# Patient Record
Sex: FEMALE | Race: OTHER | HISPANIC OR LATINO
[De-identification: names, ages, dates, MRNs, and addresses within clinical notes are randomized per-mention and may not be internally consistent; named-entity substitution may affect disease eponyms.]

---

## 2019-07-25 ENCOUNTER — RESULT REVIEW (OUTPATIENT)
Age: 34
End: 2019-07-25

## 2019-10-01 ENCOUNTER — APPOINTMENT (OUTPATIENT)
Dept: INTERNAL MEDICINE | Facility: CLINIC | Age: 34
End: 2019-10-01

## 2020-09-16 ENCOUNTER — NON-APPOINTMENT (OUTPATIENT)
Age: 35
End: 2020-09-16

## 2020-09-16 ENCOUNTER — APPOINTMENT (OUTPATIENT)
Dept: INTERNAL MEDICINE | Facility: CLINIC | Age: 35
End: 2020-09-16
Payer: COMMERCIAL

## 2020-09-16 VITALS
SYSTOLIC BLOOD PRESSURE: 119 MMHG | BODY MASS INDEX: 21.66 KG/M2 | DIASTOLIC BLOOD PRESSURE: 68 MMHG | HEART RATE: 76 BPM | WEIGHT: 130 LBS | OXYGEN SATURATION: 98 % | HEIGHT: 65 IN

## 2020-09-16 DIAGNOSIS — Z78.9 OTHER SPECIFIED HEALTH STATUS: ICD-10-CM

## 2020-09-16 DIAGNOSIS — Z83.3 FAMILY HISTORY OF DIABETES MELLITUS: ICD-10-CM

## 2020-09-16 DIAGNOSIS — Z00.00 ENCOUNTER FOR GENERAL ADULT MEDICAL EXAMINATION W/OUT ABNORMAL FINDINGS: ICD-10-CM

## 2020-09-16 DIAGNOSIS — Z80.3 FAMILY HISTORY OF MALIGNANT NEOPLASM OF BREAST: ICD-10-CM

## 2020-09-16 PROCEDURE — 99385 PREV VISIT NEW AGE 18-39: CPT | Mod: 25

## 2020-09-16 PROCEDURE — 93000 ELECTROCARDIOGRAM COMPLETE: CPT

## 2020-09-16 PROCEDURE — 36415 COLL VENOUS BLD VENIPUNCTURE: CPT

## 2020-09-16 NOTE — HEALTH RISK ASSESSMENT
[Good] : ~his/her~  mood as  good [Yes] : Yes [] : No [0] : 2) Feeling down, depressed, or hopeless: Not at all (0) [de-identified] : a lotof exercise [Patient reported PAP Smear was normal] : Patient reported PAP Smear was normal [Fully functional (bathing, dressing, toileting, transferring, walking, feeding)] : Fully functional (bathing, dressing, toileting, transferring, walking, feeding) [Reports changes in hearing] : Reports no changes in hearing [Fully functional (using the telephone, shopping, preparing meals, housekeeping, doing laundry, using] : Fully functional and needs no help or supervision to perform IADLs (using the telephone, shopping, preparing meals, housekeeping, doing laundry, using transportation, managing medications and managing finances) [Reports changes in vision] : Reports no changes in vision [Reports changes in dental health] : Reports no changes in dental health [PapSmearDate] : 2010.

## 2020-09-16 NOTE — HISTORY OF PRESENT ILLNESS
[FreeTextEntry1] : says numbness over right foot  for 2 months  she feels like an electric shock  over the  side of the foot  she denies any back pain

## 2020-09-17 LAB
ALBUMIN SERPL ELPH-MCNC: 4.7 G/DL
ALP BLD-CCNC: 54 U/L
ALT SERPL-CCNC: 14 U/L
ANION GAP SERPL CALC-SCNC: 11 MMOL/L
AST SERPL-CCNC: 19 U/L
BASOPHILS # BLD AUTO: 0.04 K/UL
BASOPHILS NFR BLD AUTO: 0.4 %
BILIRUB SERPL-MCNC: 0.6 MG/DL
BUN SERPL-MCNC: 14 MG/DL
CALCIUM SERPL-MCNC: 9.3 MG/DL
CHLORIDE SERPL-SCNC: 102 MMOL/L
CHOLEST SERPL-MCNC: 178 MG/DL
CHOLEST/HDLC SERPL: 2.9 RATIO
CO2 SERPL-SCNC: 25 MMOL/L
CREAT SERPL-MCNC: 0.76 MG/DL
EOSINOPHIL # BLD AUTO: 0.29 K/UL
EOSINOPHIL NFR BLD AUTO: 2.7 %
GLUCOSE SERPL-MCNC: 81 MG/DL
HCT VFR BLD CALC: 41.2 %
HDLC SERPL-MCNC: 60 MG/DL
HGB BLD-MCNC: 13.7 G/DL
IMM GRANULOCYTES NFR BLD AUTO: 0.3 %
LDLC SERPL CALC-MCNC: 96 MG/DL
LYMPHOCYTES # BLD AUTO: 1.95 K/UL
LYMPHOCYTES NFR BLD AUTO: 18.1 %
MAN DIFF?: NORMAL
MCHC RBC-ENTMCNC: 32 PG
MCHC RBC-ENTMCNC: 33.3 GM/DL
MCV RBC AUTO: 96.3 FL
MONOCYTES # BLD AUTO: 0.67 K/UL
MONOCYTES NFR BLD AUTO: 6.2 %
NEUTROPHILS # BLD AUTO: 7.77 K/UL
NEUTROPHILS NFR BLD AUTO: 72.3 %
PLATELET # BLD AUTO: 224 K/UL
POTASSIUM SERPL-SCNC: 4.2 MMOL/L
PROT SERPL-MCNC: 6.6 G/DL
RBC # BLD: 4.28 M/UL
RBC # FLD: 12.2 %
SODIUM SERPL-SCNC: 138 MMOL/L
TRIGL SERPL-MCNC: 109 MG/DL
WBC # FLD AUTO: 10.75 K/UL

## 2020-12-23 ENCOUNTER — RESULT REVIEW (OUTPATIENT)
Age: 35
End: 2020-12-23

## 2021-02-12 ENCOUNTER — APPOINTMENT (OUTPATIENT)
Dept: ANTEPARTUM | Facility: CLINIC | Age: 36
End: 2021-02-12
Payer: COMMERCIAL

## 2021-02-12 ENCOUNTER — ASOB RESULT (OUTPATIENT)
Age: 36
End: 2021-02-12

## 2021-02-12 PROCEDURE — 36416 COLLJ CAPILLARY BLOOD SPEC: CPT

## 2021-02-12 PROCEDURE — 99072 ADDL SUPL MATRL&STAF TM PHE: CPT

## 2021-02-12 PROCEDURE — 76813 OB US NUCHAL MEAS 1 GEST: CPT | Mod: 59

## 2021-02-12 PROCEDURE — 76801 OB US < 14 WKS SINGLE FETUS: CPT

## 2021-03-29 ENCOUNTER — TRANSCRIPTION ENCOUNTER (OUTPATIENT)
Age: 36
End: 2021-03-29

## 2021-04-01 ENCOUNTER — NON-APPOINTMENT (OUTPATIENT)
Age: 36
End: 2021-04-01

## 2021-04-09 ENCOUNTER — APPOINTMENT (OUTPATIENT)
Dept: ANTEPARTUM | Facility: CLINIC | Age: 36
End: 2021-04-09
Payer: COMMERCIAL

## 2021-04-09 ENCOUNTER — ASOB RESULT (OUTPATIENT)
Age: 36
End: 2021-04-09

## 2021-04-09 PROCEDURE — 99072 ADDL SUPL MATRL&STAF TM PHE: CPT

## 2021-04-09 PROCEDURE — 99202 OFFICE O/P NEW SF 15 MIN: CPT | Mod: 25

## 2021-04-09 PROCEDURE — 76811 OB US DETAILED SNGL FETUS: CPT

## 2021-05-13 ENCOUNTER — APPOINTMENT (OUTPATIENT)
Dept: INTERNAL MEDICINE | Facility: CLINIC | Age: 36
End: 2021-05-13
Payer: COMMERCIAL

## 2021-05-13 VITALS
WEIGHT: 153 LBS | TEMPERATURE: 97.8 F | DIASTOLIC BLOOD PRESSURE: 75 MMHG | BODY MASS INDEX: 25.49 KG/M2 | OXYGEN SATURATION: 99 % | HEART RATE: 75 BPM | HEIGHT: 65 IN | SYSTOLIC BLOOD PRESSURE: 115 MMHG

## 2021-05-13 DIAGNOSIS — R68.84 JAW PAIN: ICD-10-CM

## 2021-05-13 DIAGNOSIS — J01.10 ACUTE FRONTAL SINUSITIS, UNSPECIFIED: ICD-10-CM

## 2021-05-13 PROCEDURE — 99213 OFFICE O/P EST LOW 20 MIN: CPT

## 2021-05-13 PROCEDURE — 99072 ADDL SUPL MATRL&STAF TM PHE: CPT

## 2021-05-13 RX ORDER — AZITHROMYCIN 250 MG/1
250 TABLET, FILM COATED ORAL
Qty: 1 | Refills: 0 | Status: ACTIVE | COMMUNITY
Start: 2021-05-13 | End: 1900-01-01

## 2021-05-13 NOTE — PHYSICAL EXAM
[Normal] : no acute distress, well nourished, well developed and well-appearing [Normal Oropharynx] : the oropharynx was normal [Normal TMs] : both tympanic membranes were normal [No Lymphadenopathy] : no lymphadenopathy [de-identified] : no localized pain over jaw

## 2021-05-13 NOTE — HISTORY OF PRESENT ILLNESS
[FreeTextEntry8] : has a few days of left jaw pain  and now left ear pain pain is  severe and worst when opening mouth all the way  had some pain a few weeks ago in the same ear but it got better. she has no fever and no nasal discharge  she is 25 weeks pregnant

## 2021-05-14 ENCOUNTER — TRANSCRIPTION ENCOUNTER (OUTPATIENT)
Age: 36
End: 2021-05-14

## 2021-06-04 ENCOUNTER — APPOINTMENT (OUTPATIENT)
Dept: ANTEPARTUM | Facility: CLINIC | Age: 36
End: 2021-06-04
Payer: COMMERCIAL

## 2021-06-04 ENCOUNTER — ASOB RESULT (OUTPATIENT)
Age: 36
End: 2021-06-04

## 2021-06-04 PROCEDURE — 99072 ADDL SUPL MATRL&STAF TM PHE: CPT

## 2021-06-04 PROCEDURE — 76820 UMBILICAL ARTERY ECHO: CPT

## 2021-06-04 PROCEDURE — 76816 OB US FOLLOW-UP PER FETUS: CPT

## 2021-07-16 ENCOUNTER — APPOINTMENT (OUTPATIENT)
Dept: ANTEPARTUM | Facility: CLINIC | Age: 36
End: 2021-07-16

## 2021-07-16 ENCOUNTER — ASOB RESULT (OUTPATIENT)
Age: 36
End: 2021-07-16

## 2021-07-16 ENCOUNTER — APPOINTMENT (OUTPATIENT)
Dept: ANTEPARTUM | Facility: CLINIC | Age: 36
End: 2021-07-16
Payer: COMMERCIAL

## 2021-07-16 PROCEDURE — 99072 ADDL SUPL MATRL&STAF TM PHE: CPT

## 2021-07-16 PROCEDURE — 76816 OB US FOLLOW-UP PER FETUS: CPT

## 2021-08-17 ENCOUNTER — APPOINTMENT (OUTPATIENT)
Dept: ANTEPARTUM | Facility: CLINIC | Age: 36
End: 2021-08-17

## 2021-08-18 ENCOUNTER — TRANSCRIPTION ENCOUNTER (OUTPATIENT)
Age: 36
End: 2021-08-18

## 2021-08-25 ENCOUNTER — ASOB RESULT (OUTPATIENT)
Age: 36
End: 2021-08-25

## 2021-08-25 ENCOUNTER — APPOINTMENT (OUTPATIENT)
Dept: ANTEPARTUM | Facility: CLINIC | Age: 36
End: 2021-08-25
Payer: COMMERCIAL

## 2021-08-25 PROCEDURE — 76816 OB US FOLLOW-UP PER FETUS: CPT

## 2021-09-01 ENCOUNTER — INPATIENT (INPATIENT)
Facility: HOSPITAL | Age: 36
LOS: 1 days | Discharge: HOME | End: 2021-09-03
Attending: OBSTETRICS & GYNECOLOGY | Admitting: OBSTETRICS & GYNECOLOGY
Payer: COMMERCIAL

## 2021-09-01 VITALS — SYSTOLIC BLOOD PRESSURE: 116 MMHG | DIASTOLIC BLOOD PRESSURE: 72 MMHG

## 2021-09-01 DIAGNOSIS — Z34.90 ENCOUNTER FOR SUPERVISION OF NORMAL PREGNANCY, UNSPECIFIED, UNSPECIFIED TRIMESTER: ICD-10-CM

## 2021-09-01 DIAGNOSIS — Z3A.00 WEEKS OF GESTATION OF PREGNANCY NOT SPECIFIED: ICD-10-CM

## 2021-09-01 DIAGNOSIS — Z34.80 ENCOUNTER FOR SUPERVISION OF OTHER NORMAL PREGNANCY, UNSPECIFIED TRIMESTER: ICD-10-CM

## 2021-09-01 DIAGNOSIS — O26.899 OTHER SPECIFIED PREGNANCY RELATED CONDITIONS, UNSPECIFIED TRIMESTER: ICD-10-CM

## 2021-09-01 LAB
BASOPHILS # BLD AUTO: 0.03 K/UL — SIGNIFICANT CHANGE UP (ref 0–0.2)
BASOPHILS NFR BLD AUTO: 0.3 % — SIGNIFICANT CHANGE UP (ref 0–2)
BLD GP AB SCN SERPL QL: NEGATIVE — SIGNIFICANT CHANGE UP
EOSINOPHIL # BLD AUTO: 0.12 K/UL — SIGNIFICANT CHANGE UP (ref 0–0.5)
EOSINOPHIL NFR BLD AUTO: 1.2 % — SIGNIFICANT CHANGE UP (ref 0–6)
HCT VFR BLD CALC: 36.7 % — SIGNIFICANT CHANGE UP (ref 34.5–45)
HGB BLD-MCNC: 12.4 G/DL — SIGNIFICANT CHANGE UP (ref 11.5–15.5)
IMM GRANULOCYTES NFR BLD AUTO: 0.3 % — SIGNIFICANT CHANGE UP (ref 0–1.5)
LYMPHOCYTES # BLD AUTO: 1.87 K/UL — SIGNIFICANT CHANGE UP (ref 1–3.3)
LYMPHOCYTES # BLD AUTO: 19.3 % — SIGNIFICANT CHANGE UP (ref 13–44)
MCHC RBC-ENTMCNC: 30.5 PG — SIGNIFICANT CHANGE UP (ref 27–34)
MCHC RBC-ENTMCNC: 33.8 GM/DL — SIGNIFICANT CHANGE UP (ref 32–36)
MCV RBC AUTO: 90.4 FL — SIGNIFICANT CHANGE UP (ref 80–100)
MONOCYTES # BLD AUTO: 0.68 K/UL — SIGNIFICANT CHANGE UP (ref 0–0.9)
MONOCYTES NFR BLD AUTO: 7 % — SIGNIFICANT CHANGE UP (ref 2–14)
NEUTROPHILS # BLD AUTO: 6.95 K/UL — SIGNIFICANT CHANGE UP (ref 1.8–7.4)
NEUTROPHILS NFR BLD AUTO: 71.9 % — SIGNIFICANT CHANGE UP (ref 43–77)
NRBC # BLD: 0 /100 WBCS — SIGNIFICANT CHANGE UP (ref 0–0)
PLATELET # BLD AUTO: 183 K/UL — SIGNIFICANT CHANGE UP (ref 150–400)
RBC # BLD: 4.06 M/UL — SIGNIFICANT CHANGE UP (ref 3.8–5.2)
RBC # FLD: 13 % — SIGNIFICANT CHANGE UP (ref 10.3–14.5)
RH IG SCN BLD-IMP: POSITIVE — SIGNIFICANT CHANGE UP
RH IG SCN BLD-IMP: POSITIVE — SIGNIFICANT CHANGE UP
WBC # BLD: 9.68 K/UL — SIGNIFICANT CHANGE UP (ref 3.8–10.5)
WBC # FLD AUTO: 9.68 K/UL — SIGNIFICANT CHANGE UP (ref 3.8–10.5)

## 2021-09-01 RX ORDER — CITRIC ACID/SODIUM CITRATE 300-500 MG
15 SOLUTION, ORAL ORAL EVERY 6 HOURS
Refills: 0 | Status: DISCONTINUED | OUTPATIENT
Start: 2021-09-01 | End: 2021-09-02

## 2021-09-01 RX ORDER — SODIUM CHLORIDE 9 MG/ML
1000 INJECTION, SOLUTION INTRAVENOUS
Refills: 0 | Status: DISCONTINUED | OUTPATIENT
Start: 2021-09-01 | End: 2021-09-02

## 2021-09-01 RX ORDER — VANCOMYCIN HCL 1 G
1000 VIAL (EA) INTRAVENOUS ONCE
Refills: 0 | Status: COMPLETED | OUTPATIENT
Start: 2021-09-01 | End: 2021-09-01

## 2021-09-01 RX ORDER — OXYTOCIN 10 UNIT/ML
333.33 VIAL (ML) INJECTION
Qty: 20 | Refills: 0 | Status: COMPLETED | OUTPATIENT
Start: 2021-09-01 | End: 2021-09-02

## 2021-09-01 RX ORDER — VANCOMYCIN HCL 1 G
1000 VIAL (EA) INTRAVENOUS EVERY 12 HOURS
Refills: 0 | Status: DISCONTINUED | OUTPATIENT
Start: 2021-09-02 | End: 2021-09-02

## 2021-09-01 RX ORDER — OXYTOCIN 10 UNIT/ML
4 VIAL (ML) INJECTION
Qty: 30 | Refills: 0 | Status: DISCONTINUED | OUTPATIENT
Start: 2021-09-01 | End: 2021-09-03

## 2021-09-01 RX ORDER — VANCOMYCIN HCL 1 G
VIAL (EA) INTRAVENOUS
Refills: 0 | Status: DISCONTINUED | OUTPATIENT
Start: 2021-09-01 | End: 2021-09-02

## 2021-09-01 RX ADMIN — Medication 250 MILLIGRAM(S): at 22:20

## 2021-09-01 NOTE — OB PROVIDER TRIAGE NOTE - NS_OBGYNHISTORY_OBGYN_ALL_OB_FT
POBHx:  2016  @ 38  wks. gest., 6#15               SAB @ 6 wks. gest.,     PGYnHx:  Denies fibroids, endometriosis, STD, Abn Pap, Ovarian Cyst OBHx:  FT   6lbs 15oz; FT   7lbs 2oz  MedHX: denies  SurgHx: denies  GynHx: h/o abnormal paps >10 years ago s/p colposcopy > 10 years ago wnl paps since; denies fibroids, cysts, STIs

## 2021-09-01 NOTE — OB PROVIDER H&P - HISTORY OF PRESENT ILLNESS
32 year old  at 40.3 weeks presents with contractions q5min since 12pm. Seen in OB office earlier today, 3cm sent home to see if contractions intensify. Patient reports contractions intense but irregular and tolerable, denies need for epidural at this time. -LOF, -VB, +FM. Pregnancy complicated low papA and Elevated Inhibin A.    OBHx:  FT   6lbs 15oz; FT   7lbs 2oz  MedHX: denies  SurgHx: denies  GynHx: h/o abnormal paps >10 years ago s/p colposcopy > 10 years ago wnl paps since; denies fibroids, cysts, STIs  SocialHx: denies ETOH, tobacco, drug use  PyschHx: h/o PPD -no medications, no therapy  Meds:  PNV, ASA  All: PCN-hives; Amoxicillin -hives, NKEA, NKFA    Vital Signs Last 24 Hrs  T(C): 37.4 (01 Sep 2021 18:10), Max: 37.4 (01 Sep 2021 18:10)  T(F): 99.3 (01 Sep 2021 18:10), Max: 99.3 (01 Sep 2021 18:10)  HR: 86 (01 Sep 2021 19:19) (73 - 90)  BP: 116/72 (01 Sep 2021 17:58) (116/72 - 116/72)  BP(mean): --  RR: --  SpO2: 97% (01 Sep 2021 19:19) (93% - 99%)    PE: NAD, AOx3, abdomen soft gravid  VE: 4.0/75/-3  EFM: 150, moderate variability, +Accels, -decels  [place]: q7min  EFW: 3400 grams

## 2021-09-01 NOTE — OB RN TRIAGE NOTE - NS_ARRIVALFROM_OBGYN_ALL_OB
Pt arrived via [facility] from home with co back pain. Pt states she had a MRI yesterday at [facility]. Pt states she has chronic back pain from and injury in 2007. Admitting Office

## 2021-09-01 NOTE — OB PROVIDER H&P - ASSESSMENT
35 year old  at 40.3 weeks in labor, +GBS.    -Admit to L and D  -Routine labs  -NPO/IV  -Bictra  -EFM/toco  -Anesthesia consult  -Vancomycin for GBS prophylaxis  -Pitocin 4x4  -Anticipate     d/w MD Von Ruiz [facility]

## 2021-09-01 NOTE — OB PROVIDER TRIAGE NOTE - HISTORY OF PRESENT ILLNESS
32 y.o.  Female  EDC 21 IUP @ 38 4/7 wks. gest. (-) GBS, who c/o ctx that have varied in strength & frequency.  (+) FM. (-) Vaginal Bleeding/Fluid.  Pt. had Elevated Inhibin A for which she has been on Baby ASA and followed with growth scans Q 4 wks.  Pt. denies having COVID/Exposure to Covid (+) Individual/Symptoms/or receiving vaccine.  EFW 3200 gms.    POBHx:  2016  @ 38 4/7 wks. gest., 6#15               SAB @ 6 wks. gest.,     PGYnHx:  Denies fibroids, endometriosis, STD, Abn Pap, Ovarian Cyst    PMHx:  Denies    PSHx:  Denies    Meds:  PNV 1 p.o. daily    NKDA    SocHx:  Denies smoking tobacco/ETOH/Illegal drug use    FHx:  F-HTN, DM   32 year old  at 40.3 weeks presents with contractions q5min since 12pm. Seen in OB office earlier today, 3cm sent home to see if contractions intensify. Patient reports contractions intense but irregular and tolerable, denies need for epidural at this time. -LOF, -VB, +FM. Pregnancy complicated low papA and Elevated Inhibin A.    OBHx:  FT   6lbs 15oz; FT   7lbs 2oz  MedHX: denies  SurgHx: denies  GynHx: h/o abnormal paps >10 years ago s/p colposcopy > 10 years ago wnl paps since; denies fibroids, cysts, STIs  SocialHx: denies ETOH, tobacco, drug use  PyschHx: h/o PPD -no medications, no therapy  Meds:  PNV, ASA  All: NKDA, NKEA, NKFA    Vital Signs Last 24 Hrs  T(C): 37.4 (01 Sep 2021 18:10), Max: 37.4 (01 Sep 2021 18:10)  T(F): 99.3 (01 Sep 2021 18:10), Max: 99.3 (01 Sep 2021 18:10)  HR: 86 (01 Sep 2021 19:19) (73 - 90)  BP: 116/72 (01 Sep 2021 17:58) (116/72 - 116/72)  BP(mean): --  RR: --  SpO2: 97% (01 Sep 2021 19:19) (93% - 99%)    PE: NAD, AOx3, abdomen soft gravid  VE: 3.0/70/-3  EFM: 150, moderate variability, +Accels, -decels  [place]: q7min  EFW: 3400 grams 32 year old  at 40.3 weeks presents with contractions q5min since 12pm. Seen in OB office earlier today, 3cm sent home to see if contractions intensify. Patient reports contractions intense but irregular and tolerable, denies need for epidural at this time. -LOF, -VB, +FM. Pregnancy complicated low papA and Elevated Inhibin A.    OBHx:  FT   6lbs 15oz; FT   7lbs 2oz  MedHX: denies  SurgHx: denies  GynHx: h/o abnormal paps >10 years ago s/p colposcopy > 10 years ago wnl paps since; denies fibroids, cysts, STIs  SocialHx: denies ETOH, tobacco, drug use  PyschHx: h/o PPD -no medications, no therapy  Meds:  PNV, ASA  All: PCN-hives; Amoxicillin -hives, NKEA, NKFA    Vital Signs Last 24 Hrs  T(C): 37.4 (01 Sep 2021 18:10), Max: 37.4 (01 Sep 2021 18:10)  T(F): 99.3 (01 Sep 2021 18:10), Max: 99.3 (01 Sep 2021 18:10)  HR: 86 (01 Sep 2021 19:19) (73 - 90)  BP: 116/72 (01 Sep 2021 17:58) (116/72 - 116/72)  BP(mean): --  RR: --  SpO2: 97% (01 Sep 2021 19:19) (93% - 99%)    PE: NAD, AOx3, abdomen soft gravid  VE: 3.0/70/-3  EFM: 150, moderate variability, +Accels, -decels  [place]: q7min  EFW: 3400 grams

## 2021-09-01 NOTE — OB PROVIDER H&P - PROBLEM SELECTOR PLAN 1
-Admit to L and D  -Routine labs  -NPO/IV  -Bictra  -EFM/toco  -Anesthesia consult  -Vancomycin for GBS prophylaxis  -Pitocin 4x4  -Anticipate

## 2021-09-01 NOTE — OB PROVIDER H&P - NS_OBGYNHISTORY_OBGYN_ALL_OB_FT
OBHx:  FT   6lbs 15oz; FT   7lbs 2oz    GynHx: h/o abnormal paps >10 years ago s/p colposcopy > 10 years ago wnl paps since; denies fibroids, cysts, STIs

## 2021-09-01 NOTE — OB PROVIDER TRIAGE NOTE - NSOBPROVIDERNOTE_OBGYN_ALL_OB_FT
32 y.o.  Female  EDC 21 IUP @ 38 4/7 wks. gest., (-) GBS, who presents to r/o labor.    PLAN:  BR            EFM/TOCO            Rpt. VE in 1 hr.    RITA Hillman 35 year old  at 40.3 weeks with unchanged exam since earlier today in office, denies desire for pain medication.    -4BPP    d/w MD Von Ruiz FNP-BC

## 2021-09-02 ENCOUNTER — TRANSCRIPTION ENCOUNTER (OUTPATIENT)
Age: 36
End: 2021-09-02

## 2021-09-02 ENCOUNTER — APPOINTMENT (OUTPATIENT)
Dept: ANTEPARTUM | Facility: CLINIC | Age: 36
End: 2021-09-02

## 2021-09-02 LAB
COVID-19 SPIKE DOMAIN AB INTERP: POSITIVE
COVID-19 SPIKE DOMAIN ANTIBODY RESULT: >250 U/ML — HIGH
SARS-COV-2 IGG+IGM SERPL QL IA: >250 U/ML — HIGH
SARS-COV-2 IGG+IGM SERPL QL IA: POSITIVE
SARS-COV-2 RNA SPEC QL NAA+PROBE: SIGNIFICANT CHANGE UP
T PALLIDUM AB TITR SER: NEGATIVE — SIGNIFICANT CHANGE UP

## 2021-09-02 RX ORDER — DIBUCAINE 1 %
1 OINTMENT (GRAM) RECTAL EVERY 6 HOURS
Refills: 0 | Status: DISCONTINUED | OUTPATIENT
Start: 2021-09-02 | End: 2021-09-03

## 2021-09-02 RX ORDER — DIPHENHYDRAMINE HCL 50 MG
25 CAPSULE ORAL EVERY 6 HOURS
Refills: 0 | Status: DISCONTINUED | OUTPATIENT
Start: 2021-09-02 | End: 2021-09-03

## 2021-09-02 RX ORDER — IBUPROFEN 200 MG
1 TABLET ORAL
Qty: 0 | Refills: 0 | DISCHARGE
Start: 2021-09-02

## 2021-09-02 RX ORDER — OXYTOCIN 10 UNIT/ML
333.33 VIAL (ML) INJECTION
Qty: 20 | Refills: 0 | Status: DISCONTINUED | OUTPATIENT
Start: 2021-09-02 | End: 2021-09-03

## 2021-09-02 RX ORDER — ACETAMINOPHEN 500 MG
975 TABLET ORAL
Refills: 0 | Status: DISCONTINUED | OUTPATIENT
Start: 2021-09-02 | End: 2021-09-03

## 2021-09-02 RX ORDER — PRAMOXINE HYDROCHLORIDE 150 MG/15G
1 AEROSOL, FOAM RECTAL EVERY 4 HOURS
Refills: 0 | Status: DISCONTINUED | OUTPATIENT
Start: 2021-09-02 | End: 2021-09-03

## 2021-09-02 RX ORDER — OXYCODONE HYDROCHLORIDE 5 MG/1
5 TABLET ORAL ONCE
Refills: 0 | Status: DISCONTINUED | OUTPATIENT
Start: 2021-09-02 | End: 2021-09-03

## 2021-09-02 RX ORDER — LANOLIN
1 OINTMENT (GRAM) TOPICAL EVERY 6 HOURS
Refills: 0 | Status: DISCONTINUED | OUTPATIENT
Start: 2021-09-02 | End: 2021-09-03

## 2021-09-02 RX ORDER — TETANUS TOXOID, REDUCED DIPHTHERIA TOXOID AND ACELLULAR PERTUSSIS VACCINE, ADSORBED 5; 2.5; 8; 8; 2.5 [IU]/.5ML; [IU]/.5ML; UG/.5ML; UG/.5ML; UG/.5ML
0.5 SUSPENSION INTRAMUSCULAR ONCE
Refills: 0 | Status: DISCONTINUED | OUTPATIENT
Start: 2021-09-02 | End: 2021-09-03

## 2021-09-02 RX ORDER — MAGNESIUM HYDROXIDE 400 MG/1
30 TABLET, CHEWABLE ORAL
Refills: 0 | Status: DISCONTINUED | OUTPATIENT
Start: 2021-09-02 | End: 2021-09-03

## 2021-09-02 RX ORDER — BENZOCAINE 10 %
1 GEL (GRAM) MUCOUS MEMBRANE EVERY 6 HOURS
Refills: 0 | Status: DISCONTINUED | OUTPATIENT
Start: 2021-09-02 | End: 2021-09-03

## 2021-09-02 RX ORDER — KETOROLAC TROMETHAMINE 30 MG/ML
30 SYRINGE (ML) INJECTION ONCE
Refills: 0 | Status: DISCONTINUED | OUTPATIENT
Start: 2021-09-02 | End: 2021-09-02

## 2021-09-02 RX ORDER — SODIUM CHLORIDE 9 MG/ML
3 INJECTION INTRAMUSCULAR; INTRAVENOUS; SUBCUTANEOUS EVERY 8 HOURS
Refills: 0 | Status: DISCONTINUED | OUTPATIENT
Start: 2021-09-02 | End: 2021-09-03

## 2021-09-02 RX ORDER — IBUPROFEN 200 MG
600 TABLET ORAL EVERY 6 HOURS
Refills: 0 | Status: DISCONTINUED | OUTPATIENT
Start: 2021-09-02 | End: 2021-09-03

## 2021-09-02 RX ORDER — AER TRAVELER 0.5 G/1
1 SOLUTION RECTAL; TOPICAL EVERY 4 HOURS
Refills: 0 | Status: DISCONTINUED | OUTPATIENT
Start: 2021-09-02 | End: 2021-09-03

## 2021-09-02 RX ORDER — IBUPROFEN 200 MG
600 TABLET ORAL EVERY 6 HOURS
Refills: 0 | Status: COMPLETED | OUTPATIENT
Start: 2021-09-02 | End: 2022-08-01

## 2021-09-02 RX ORDER — ACETAMINOPHEN 500 MG
3 TABLET ORAL
Qty: 0 | Refills: 0 | DISCHARGE
Start: 2021-09-02

## 2021-09-02 RX ORDER — SIMETHICONE 80 MG/1
80 TABLET, CHEWABLE ORAL EVERY 4 HOURS
Refills: 0 | Status: DISCONTINUED | OUTPATIENT
Start: 2021-09-02 | End: 2021-09-03

## 2021-09-02 RX ORDER — OXYCODONE HYDROCHLORIDE 5 MG/1
5 TABLET ORAL
Refills: 0 | Status: DISCONTINUED | OUTPATIENT
Start: 2021-09-02 | End: 2021-09-03

## 2021-09-02 RX ORDER — HYDROCORTISONE 1 %
1 OINTMENT (GRAM) TOPICAL EVERY 6 HOURS
Refills: 0 | Status: DISCONTINUED | OUTPATIENT
Start: 2021-09-02 | End: 2021-09-03

## 2021-09-02 RX ADMIN — SODIUM CHLORIDE 3 MILLILITER(S): 9 INJECTION INTRAMUSCULAR; INTRAVENOUS; SUBCUTANEOUS at 05:54

## 2021-09-02 RX ADMIN — Medication 30 MILLIGRAM(S): at 03:59

## 2021-09-02 RX ADMIN — Medication 975 MILLIGRAM(S): at 17:00

## 2021-09-02 RX ADMIN — Medication 600 MILLIGRAM(S): at 10:07

## 2021-09-02 RX ADMIN — Medication 975 MILLIGRAM(S): at 23:03

## 2021-09-02 RX ADMIN — Medication 600 MILLIGRAM(S): at 09:37

## 2021-09-02 RX ADMIN — Medication 4 MILLIUNIT(S)/MIN: at 00:15

## 2021-09-02 RX ADMIN — Medication 975 MILLIGRAM(S): at 23:33

## 2021-09-02 RX ADMIN — Medication 1000 MILLIUNIT(S)/MIN: at 02:46

## 2021-09-02 RX ADMIN — Medication 1 TABLET(S): at 11:49

## 2021-09-02 RX ADMIN — Medication 600 MILLIGRAM(S): at 14:38

## 2021-09-02 RX ADMIN — Medication 975 MILLIGRAM(S): at 12:20

## 2021-09-02 RX ADMIN — Medication 975 MILLIGRAM(S): at 11:50

## 2021-09-02 RX ADMIN — Medication 975 MILLIGRAM(S): at 06:09

## 2021-09-02 RX ADMIN — Medication 600 MILLIGRAM(S): at 20:10

## 2021-09-02 RX ADMIN — Medication 600 MILLIGRAM(S): at 20:40

## 2021-09-02 NOTE — OB RN DELIVERY SUMMARY - NS_SEPSISRSKCALC_OBGYN_ALL_OB_FT
EOS calculated successfully. EOS Risk Factor: 0.5/1000 live births ([facility] national incidence); GA=40w3d; Temp=99.3; ROM=0.6; GBS='Positive'; Antibiotics='Broad spectrum antibiotics > 4 hrs prior to birth'

## 2021-09-02 NOTE — PRE-ANESTHESIA EVALUATION ADULT - NSANTHOSAYNRD_GEN_A_CORE
No. YENNI screening performed.  STOP BANG Legend: 0-2 = LOW Risk; 3-4 = INTERMEDIATE Risk; 5-8 = HIGH Risk

## 2021-09-02 NOTE — DISCHARGE NOTE OB - CARE PROVIDER_API CALL
Winsome Longoria  OBSTETRICS AND GYNECOLOGY  [address], [address]  [place] [address]  Phone: [phone]  Fax: [phone]  Follow Up Time:

## 2021-09-02 NOTE — OB PROVIDER DELIVERY SUMMARY - NSPROVIDERDELIVERYNOTE_OBGYN_ALL_OB_FT
, pt delivered of a viable male infat apgar 9/9, placenta complete and uterus epxlored. nuchal cord times one. second degree laceration. ebl 150 cc.

## 2021-09-02 NOTE — OB NEONATOLOGY/PEDIATRICIAN DELIVERY SUMMARY - NSPEDSNEONOTESA_OBGYN_ALL_OB_FT
Baby is a 40.3 wk GA boy born to a 36 y/o  mother via  . Peds requested to attend delivery for meconium. Maternal history uncomplicated. Prenatal history uncomplicated. Maternal BT O+. PNL neg, NR, and immune. GBS positive, received vancomycin x1. SROM at 0045 on ,  mec  fluids. Baby born vigorous and crying spontaneously. WDSS. Apgars 9/9. EOS 0.01. Mom plans to breastfeed, no HepB, no circ.

## 2021-09-02 NOTE — DISCHARGE NOTE OB - PATIENT PORTAL LINK FT
You can access the FollowMyHealth Patient Portal offered by [facility] by registering at the following website: [email or web address]. By joining [employer]’s FollowMyHealth portal, you will also be able to view your health information using other applications (apps) compatible with our system.

## 2021-09-02 NOTE — DISCHARGE NOTE OB - CARE PLAN
Principal Discharge DX:	 (normal spontaneous vaginal delivery)  Assessment and plan of treatment:	return to baseline health, regular diet, pain control, follow up with Dr Longoria   1

## 2021-09-02 NOTE — OB RN DELIVERY SUMMARY - NSSELHIDDEN_OBGYN_ALL_OB_FT
[[email or web address]_OBGYN_ALL_OB_FT:[email or web address]=],[NS_DeliveryRN_OBGYN_ALL_OB_FT:[email or web address]=]

## 2021-09-03 VITALS
DIASTOLIC BLOOD PRESSURE: 75 MMHG | RESPIRATION RATE: 18 BRPM | SYSTOLIC BLOOD PRESSURE: 110 MMHG | TEMPERATURE: 98 F | HEART RATE: 60 BPM | OXYGEN SATURATION: 98 %

## 2021-09-03 RX ADMIN — Medication 600 MILLIGRAM(S): at 02:51

## 2021-09-03 RX ADMIN — Medication 1 TABLET(S): at 12:32

## 2021-09-03 RX ADMIN — Medication 600 MILLIGRAM(S): at 02:21

## 2021-09-03 RX ADMIN — Medication 975 MILLIGRAM(S): at 05:28

## 2021-09-03 RX ADMIN — Medication 975 MILLIGRAM(S): at 05:58

## 2021-09-03 RX ADMIN — Medication 600 MILLIGRAM(S): at 09:04

## 2021-09-03 RX ADMIN — Medication 975 MILLIGRAM(S): at 12:32

## 2021-09-03 RX ADMIN — Medication 600 MILLIGRAM(S): at 09:35

## 2021-09-03 RX ADMIN — Medication 975 MILLIGRAM(S): at 13:00

## 2021-09-03 RX ADMIN — Medication 600 MILLIGRAM(S): at 14:50

## 2021-09-03 RX ADMIN — Medication 600 MILLIGRAM(S): at 14:34

## 2021-09-03 NOTE — PROGRESS NOTE ADULT - ASSESSMENT
Assessment:   36yo now postpartum day 1 from a , recovering well.     Plan:   - Continue scheduled Ibuprofen and Acetaminophen for pain, Oxycodone available PRN for breakthrough pain.  - Increase ambulation, SCDs when not ambulating  - Continue regular diet    Amyeo Jereen, PGY-1

## 2021-09-03 NOTE — PROGRESS NOTE ADULT - SUBJECTIVE AND OBJECTIVE BOX
OB Postpartum Progress Note: PPD #1     36yo now PPD #1 after  seen and examined at bedside, no acute overnight events. Patient denies current complaints, her pain is well controlled. States she is ambulating, voiding spontaneously, passing gas, and tolerating regular diet. Denies CP, SOB, N/V, HA, blurred vision, epigastric pain.    Vital Signs Last 24 Hours  T(C): 36.5 (21 @ 05:00), Max: 36.9 (21 @ 18:30)  HR: 60 (21 @ 05:00) (60 - 74)  BP: 110/75 (21 @ 05:00) (99/62 - 110/75)  RR: 18 (21 @ 05:00) (18 - 18)  SpO2: 98% (21 @ 05:00) (96% - 98%)    Physical Exam:  General: NAD, resting comfortably in bed   Abdomen: Soft, non-tender, non-distended, fundus firm  Extremities: Full ROM, moving all extremities spontaneously  Pelvic: Lochia wnl    Labs:    Blood Type: O Positive  Antibody Screen: --  RPR: Negative               12.4   9.68  )-----------( 183      (  @ 22:24 )             36.7         MEDICATIONS  (STANDING):  acetaminophen   Tablet .. 975 milliGRAM(s) Oral <User Schedule>  diphtheria/tetanus/pertussis (acellular) Vaccine (ADAcel) 0.5 milliLiter(s) IntraMuscular once  ibuprofen  Tablet. 600 milliGRAM(s) Oral every 6 hours  oxytocin Infusion 333.333 milliUNIT(s)/Min (1000 mL/Hr) IV Continuous <Continuous>  oxytocin Infusion. 4 milliUNIT(s)/Min (4 mL/Hr) IV Continuous <Continuous>  prenatal multivitamin 1 Tablet(s) Oral daily  sodium chloride 0.9% lock flush 3 milliLiter(s) IV Push every 8 hours    MEDICATIONS  (PRN):  benzocaine 20%/menthol 0.5% Spray 1 Spray(s) Topical every 6 hours PRN for Perineal discomfort  dibucaine 1% Ointment 1 Application(s) Topical every 6 hours PRN Perineal discomfort  diphenhydrAMINE 25 milliGRAM(s) Oral every 6 hours PRN Pruritus  hydrocortisone 1% Cream 1 Application(s) Topical every 6 hours PRN Moderate Pain (4-6)  lanolin Ointment 1 Application(s) Topical every 6 hours PRN nipple soreness  magnesium hydroxide Suspension 30 milliLiter(s) Oral two times a day PRN Constipation  oxyCODONE    IR 5 milliGRAM(s) Oral every 3 hours PRN Moderate to Severe Pain (4-10)  oxyCODONE    IR 5 milliGRAM(s) Oral once PRN Moderate to Severe Pain (4-10)  pramoxine 1%/zinc 5% Cream 1 Application(s) Topical every 4 hours PRN Moderate Pain (4-6)  simethicone 80 milliGRAM(s) Chew every 4 hours PRN Gas  witch hazel Pads 1 Application(s) Topical every 4 hours PRN Perineal discomfort

## 2021-09-09 ENCOUNTER — NON-APPOINTMENT (OUTPATIENT)
Age: 36
End: 2021-09-09

## 2021-09-28 ENCOUNTER — TRANSCRIPTION ENCOUNTER (OUTPATIENT)
Age: 36
End: 2021-09-28

## 2021-09-29 ENCOUNTER — TRANSCRIPTION ENCOUNTER (OUTPATIENT)
Age: 36
End: 2021-09-29

## 2021-10-02 ENCOUNTER — EMERGENCY (EMERGENCY)
Facility: HOSPITAL | Age: 36
LOS: 1 days | Discharge: HOME | End: 2021-10-02
Attending: EMERGENCY MEDICINE
Payer: COMMERCIAL

## 2021-10-02 VITALS
OXYGEN SATURATION: 95 % | HEIGHT: 65 IN | TEMPERATURE: 98 F | HEART RATE: 93 BPM | RESPIRATION RATE: 18 BRPM | WEIGHT: 139.99 LBS | DIASTOLIC BLOOD PRESSURE: 67 MMHG | SYSTOLIC BLOOD PRESSURE: 99 MMHG

## 2021-10-02 VITALS
SYSTOLIC BLOOD PRESSURE: 110 MMHG | DIASTOLIC BLOOD PRESSURE: 72 MMHG | HEART RATE: 88 BPM | RESPIRATION RATE: 18 BRPM | OXYGEN SATURATION: 97 % | TEMPERATURE: 98 F

## 2021-10-02 PROCEDURE — 99284 EMERGENCY DEPT VISIT MOD MDM: CPT | Mod: 25

## 2021-10-02 PROCEDURE — 99284 EMERGENCY DEPT VISIT MOD MDM: CPT

## 2021-10-02 PROCEDURE — 76604 US EXAM CHEST: CPT | Mod: 26

## 2021-10-02 PROCEDURE — 76604 US EXAM CHEST: CPT

## 2021-10-02 RX ORDER — ACETAMINOPHEN 500 MG
650 TABLET ORAL ONCE
Refills: 0 | Status: COMPLETED | OUTPATIENT
Start: 2021-10-02 | End: 2021-10-02

## 2021-10-02 RX ADMIN — Medication 650 MILLIGRAM(S): at 14:46

## 2021-10-02 NOTE — ED PROVIDER NOTE - CLINICAL SUMMARY MEDICAL DECISION MAKING FREE TEXT BOX
35 f r breast mastitis 2/2 breast feeding w fever at home afebrile in ed -- rt breast red no fluctuance -- pocus to eval for us -- if neg continue clindamycin and fu outpt

## 2021-10-02 NOTE — ED PROVIDER NOTE - OBJECTIVE STATEMENT
35 f sp dleivery 1 month ago with pain and redness rt breast pt breast feeding seen by obgyn yesterday strted on clindamycin as pt pcn allergic -- last pm temp 101 -102 defervesced with tylenol - pt continues to pump and breast feed no other complaints-

## 2021-10-02 NOTE — ED PROVIDER NOTE - PATIENT PORTAL LINK FT
You can access the FollowMyHealth Patient Portal offered by [facility] by registering at the following website: [email or web address]. By joining [employer]’s FollowMyHealth portal, you will also be able to view your health information using other applications (apps) compatible with our system.

## 2021-10-02 NOTE — ED PROVIDER NOTE - ADDITIONAL NOTES AND INSTRUCTIONS:
continue clindamycin as directed  Motrin and tyleonol alternative for pain return with increase pain redness or worseninging symptoms

## 2021-10-02 NOTE — ED PROVIDER NOTE - PROGRESS NOTE DETAILS
dr mango escobedo - dw ob resident no further brar pt afebrile in ed - no definitive abscess continue abx

## 2021-10-02 NOTE — ED ADULT NURSE NOTE - OBJECTIVE STATEMENT
36 yo F pt currently being tx with abx for mastitis on day two of abx p/w persistant pain and fever. pt has taken Tylenol just prior to arrival with relief.  pt is A&Ox4, MAEW, breathing comfortably, breast exam done by MD with female chaperone. fever tmax 102.5  Pt denies headache, dizziness, chest pain, palpitations, cough, SOB, abdominal pain, n/v/d, urinary symptoms,, chills, weakness at this time.

## 2021-10-02 NOTE — ED PROVIDER NOTE - PHYSICAL EXAMINATION
joe - aaox3 nad ncatperrl  rt breast silver dollar area of redness andttp at [place]  no fluctuance no ax ln plap -- no masses   s1s2 rrr ctabl   softnt   ctabl

## 2021-10-02 NOTE — ED PROVIDER NOTE - COVID-19 ORDERING FACILITY
[facility]  [facility]

## 2022-03-21 ENCOUNTER — TRANSCRIPTION ENCOUNTER (OUTPATIENT)
Age: 37
End: 2022-03-21

## 2022-04-07 NOTE — OB PROVIDER TRIAGE NOTE - LABOR: BISHOP SCORE
Propranolol Pregnancy And Lactation Text: This medication is Pregnancy Category C and it isn't known if it is safe during pregnancy. It is excreted in breast milk. 6

## 2023-03-21 ENCOUNTER — APPOINTMENT (OUTPATIENT)
Dept: INTERNAL MEDICINE | Facility: CLINIC | Age: 38
End: 2023-03-21
Payer: COMMERCIAL

## 2023-03-21 VITALS
TEMPERATURE: 97.8 F | HEART RATE: 76 BPM | DIASTOLIC BLOOD PRESSURE: 68 MMHG | BODY MASS INDEX: 21.66 KG/M2 | SYSTOLIC BLOOD PRESSURE: 105 MMHG | HEIGHT: 65 IN | OXYGEN SATURATION: 98 % | WEIGHT: 130 LBS

## 2023-03-21 DIAGNOSIS — R07.0 PAIN IN THROAT: ICD-10-CM

## 2023-03-21 PROCEDURE — 99213 OFFICE O/P EST LOW 20 MIN: CPT

## 2023-03-21 RX ORDER — AZITHROMYCIN 250 MG/1
250 TABLET, FILM COATED ORAL
Qty: 1 | Refills: 0 | Status: ACTIVE | COMMUNITY
Start: 2023-03-21 | End: 1900-01-01

## 2023-03-21 NOTE — REVIEW OF SYSTEMS
[Fever] : fever [Sore Throat] : sore throat [Headache] : headache [Negative] : Heme/Lymph [Chills] : no chills [Night Sweats] : no night sweats [Earache] : no earache [Nasal Discharge] : no nasal discharge [Dizziness] : no dizziness

## 2023-03-21 NOTE — HISTORY OF PRESENT ILLNESS
[FreeTextEntry8] : Ms. MONTSE BELTRAN 37 year  female  with a no significant  PMH   present to the office due to mild headache, fever, throat discomfort for the  past 2 days. Patient's kids was tested positive for a strep throat. Patient wants to do this test today\par

## 2023-03-21 NOTE — PLAN
[FreeTextEntry1] : Ms. MONTSE BELTRAN 37 year  female  with a no significant  PMH   present to the office due to mild headache, fever, throat discomfort for the  past 2 days\par Throat culture is done and will be send to the lab\par For a throat pain, recommend  to gargle throat with a warm salty water, use cepacol lozenges. If symptoms will persist RTC. Z-pack was send to the pharmacy.  Patient is verbalized understanding\par \par

## 2023-03-23 LAB — BACTERIA THROAT CULT: NORMAL

## 2025-01-23 ENCOUNTER — APPOINTMENT (OUTPATIENT)
Dept: ORTHOPEDIC SURGERY | Facility: CLINIC | Age: 40
End: 2025-01-23